# Patient Record
Sex: FEMALE | URBAN - METROPOLITAN AREA
[De-identification: names, ages, dates, MRNs, and addresses within clinical notes are randomized per-mention and may not be internally consistent; named-entity substitution may affect disease eponyms.]

---

## 2017-05-21 ENCOUNTER — EMERGENCY (EMERGENCY)
Facility: HOSPITAL | Age: 82
LOS: 1 days | Discharge: HOME | End: 2017-05-21
Attending: EMERGENCY MEDICINE | Admitting: EMERGENCY MEDICINE
Payer: MEDICARE

## 2017-05-21 VITALS
DIASTOLIC BLOOD PRESSURE: 75 MMHG | RESPIRATION RATE: 20 BRPM | OXYGEN SATURATION: 95 % | HEART RATE: 87 BPM | SYSTOLIC BLOOD PRESSURE: 148 MMHG | TEMPERATURE: 99 F

## 2017-05-21 PROCEDURE — 99284 EMERGENCY DEPT VISIT MOD MDM: CPT | Mod: 25

## 2017-05-21 PROCEDURE — 90471 IMMUNIZATION ADMIN: CPT

## 2017-05-21 PROCEDURE — 70486 CT MAXILLOFACIAL W/O DYE: CPT | Mod: 26

## 2017-05-21 PROCEDURE — 12002 RPR S/N/AX/GEN/TRNK2.6-7.5CM: CPT

## 2017-05-21 PROCEDURE — 73130 X-RAY EXAM OF HAND: CPT | Mod: 26,RT

## 2017-05-21 PROCEDURE — 70486 CT MAXILLOFACIAL W/O DYE: CPT

## 2017-05-21 PROCEDURE — 90715 TDAP VACCINE 7 YRS/> IM: CPT

## 2017-05-21 PROCEDURE — 70450 CT HEAD/BRAIN W/O DYE: CPT | Mod: 26

## 2017-05-21 PROCEDURE — 73130 X-RAY EXAM OF HAND: CPT

## 2017-05-21 PROCEDURE — 70450 CT HEAD/BRAIN W/O DYE: CPT

## 2017-05-21 RX ORDER — TETANUS TOXOID, REDUCED DIPHTHERIA TOXOID AND ACELLULAR PERTUSSIS VACCINE, ADSORBED 5; 2.5; 8; 8; 2.5 [IU]/.5ML; [IU]/.5ML; UG/.5ML; UG/.5ML; UG/.5ML
0.5 SUSPENSION INTRAMUSCULAR ONCE
Qty: 0 | Refills: 0 | Status: COMPLETED | OUTPATIENT
Start: 2017-05-21 | End: 2017-05-21

## 2017-05-21 RX ADMIN — TETANUS TOXOID, REDUCED DIPHTHERIA TOXOID AND ACELLULAR PERTUSSIS VACCINE, ADSORBED 0.5 MILLILITER(S): 5; 2.5; 8; 8; 2.5 SUSPENSION INTRAMUSCULAR at 00:44

## 2017-05-21 NOTE — ED PROVIDER NOTE - SKIN WOUND TYPE
**ATTENDING ADDENDUM (Dr. Ry Ricci): see procedure note by DENITA Ferrari for additional information/LACERATION(S)

## 2017-05-21 NOTE — ED PROCEDURE NOTE - PROCEDURE ADDITIONAL DETAILS
Bacitracin/ telpha/ bandage applied. Bacitracin/ telpha/ bandage applied.  **ATTENDING ADDENDUM (Dr. Ry Ricci): I was present during the key portions of the procedure and immediately available during the entire procedure. Agree with plan and management. Anticipatory guidance provided.

## 2017-05-21 NOTE — ED ADULT NURSE NOTE - OBJECTIVE STATEMENT
86 yr old female with  by EMS from  home s/p fall forward out of chair, no LOC, hit face against radiator? or stool?, unable to stand up after fall, at baseline amb with walker, cane due to osteoarthritis, currently on aspirin

## 2017-05-21 NOTE — ED PROVIDER NOTE - PHYSICAL EXAMINATION
**ATTENDING ADDENDUM (Dr. Ry Ricci): I have reviewed and substantially contributed to the elements of the PE as documented above. I have directly performed an examination of this patient in conjunction with the other members (EM resident/PA/NP) of the patient care team.

## 2017-05-21 NOTE — ED PROVIDER NOTE - ATTENDING CONTRIBUTION TO CARE
**ATTENDING ADDENDUM (Dr. Ry Ricci): I attest that I have directly examined this patient and reviewed and formulated the diagnostic and therapeutic management plan in collaboration with the advanced practitioner (NP, PA). Please see MDM note and remainder of EMR for findings from CC, HPI, ROS, and PE. ([place])

## 2017-05-21 NOTE — ED PROVIDER NOTE - CARE PLAN
Principal Discharge DX:	Head trauma, initial encounter  Goal:	ATTENDING ADDENDUM (Dr. Ry Ricci): Goals of care include resolution of emergent/urgent symptoms and concerns, and restoration to baseline level of homeostasis.  Instructions for follow-up, activity and diet:	ATTENDING ADDENDUM (Dr. Ry Ricci): (1) anticipatory guidance provided  (2) rest  (3) outpatient follow-up with your primary care physician/provider (4) return if symptoms worsen, persist, or do not resolve (5) medications, if indicated, as prescribed  Secondary Diagnosis:	Laceration of finger, initial encounter

## 2017-05-21 NOTE — ED ADULT NURSE NOTE - ADDITIONAL PRINTED INSTRUCTIONS GIVEN
steady gait with rolling walker,  present, escorted via wheelchair, awaiting taxi service for transport home

## 2017-05-21 NOTE — ED PROVIDER NOTE - GASTROINTESTINAL, MLM
Abdomen soft, non-tender **ATTENDING ADDENDUM (Dr. Ry Ricci): NO guarding, rebound, or rigidity. NO pulsatile or non-pulsatile masses. NO hernias. NO obvious hepatosplenomegaly.

## 2017-05-21 NOTE — ED PROVIDER NOTE - MUSCULOSKELETAL NECK EXAM
**ATTENDING ADDENDUM (Dr. Ry Ricci): NO cervical-thoracic-lumbar-sacral midline bony tenderness or stepoff.

## 2017-05-21 NOTE — ED PROVIDER NOTE - MEDICAL DECISION MAKING DETAILS
**ATTENDING MEDICAL DECISION MAKING/SYNTHESIS (Dr. Ry Ricci): I have reviewed the Chief Complaint, the HPI, the ROS, and have directly performed and confirmed the findings on the Physical Examination. I have reviewed the medical decision making with all providers, as applicable. The PROBLEM REPRESENTATION at this time is: 86-year-old woman s/p fall forward while seated, with head trauma (mainly to nose and face) and laceration to her right index finger; on aspirin. The MOST LIKELY DIAGNOSIS, and the LIST OF DIFFERENTIAL DIAGNOSES, includes (but is not limited to) the following: traumatic brain injury e.g. concussion (likely), intracerebral hemorrhage (possible), cord/spine injury (possible), facial/nasal/orbit fractures (possible), other occult or actual injuries e.g. intra-thoracic hemorrhage (hemothorax), rib fracture(s) or flail chest, intra-abdominal hemorrhage (hemoperitoneum), pelvic or other extremity fractures or dislocations (possible), contusions/sprains/strains (likely), occult cause for fall e.g. syncope, acute coronary syndrome, pulmonary embolism/deep venous thrombosis, or equivalent (possible but less likely). The likelihood of each of these diagnoses has been appropriately considered in the context of this patient's presentation and my evaluation. PLAN: as described in EMR, including diagnostics, therapeutics and consultation as clinically warranted. I will continue to reevaluate the patient, including the results of all testing, and monitor response to therapy throughout the patient's course in the ED.

## 2017-05-21 NOTE — ED PROVIDER NOTE - CHIEF COMPLAINT
The patient is a 86y Female complaining of The patient is a 86y Female complaining of head trauma s/p fall.

## 2017-05-21 NOTE — ED PROVIDER NOTE - RESPIRATORY, MLM
Breath sounds clear and equal bilaterally. **ATTENDING ADDENDUM (Dr. Ry Ricci): BREATHING CLEAR. POSITIVE BILATERAL BREATH SOUNDS auscultated. NO stridor, drooling, tripoding, or wheezing. POSITIVE bilateral chest wall expansion WITHOUT crepitus, tenderness, or deformity. POSITIVE midline trachea.

## 2017-05-21 NOTE — ED PROVIDER NOTE - PLAN OF CARE
ATTENDING ADDENDUM (Dr. Ry Ricci): Goals of care include resolution of emergent/urgent symptoms and concerns, and restoration to baseline level of homeostasis. ATTENDING ADDENDUM (Dr. Ry Ricci): (1) anticipatory guidance provided  (2) rest  (3) outpatient follow-up with your primary care physician/provider (4) return if symptoms worsen, persist, or do not resolve (5) medications, if indicated, as prescribed

## 2017-05-21 NOTE — ED PROVIDER NOTE - ENMT, MLM
Airway patent, Nasal mucosa clear. Mouth with normal mucosa. Throat has no vesicles, no oropharyngeal exudates and uvula is midline. **ATTENDING ADDENDUM (Dr. Ry Ricci): AIRWAY CLEAR. NO pooling of secretions, POSITIVE gag reflex, NO debris, ABLE TO SELF-PROTECT AIRWAY. POSITIVE nasal soft-tissue swelling and mild deformity without significant tenderness. NO septal hematomas in the bilateral nares. NO hemotympanum bilaterally. NO stepoff of deformities.

## 2017-05-21 NOTE — ED PROVIDER NOTE - OBJECTIVE STATEMENT
**ATTENDING OBJECTIVE STATEMENT (Dr. Ry Ricci): I have reviewed this note, the presenting symptoms, and the Chief Complaint and the History of Present Illness as documented, with the other care provider(s) and nurses on the patient care team. I have also reviewed this patient's past medical/surgical history and social/family history as reviewed and listed in this electronic medical record. Patient is a 86-year-old woman with the chief complaint of laceration to the right index finger s/p fall. Presents from home via EMS with . Reports fall forward out of chair as mechanism. NO loss of consciousness, NO neck pain, NO numbness, tingling, weakness, or paresthesias, NO dizziness or weakness, NO speech or visual changes, NO chest/abdominal/back trauma. Reports hitting face against object (radiator? stool?) as well. Was unable to stand up after fall (at baseline, ambulates with walker and/or cane due to osteoarthritis). POSITIVE aspirin. NO focal or generalized weakness. Here for evaluation. VAS 2/10.
